# Patient Record
Sex: FEMALE | Race: WHITE | NOT HISPANIC OR LATINO
[De-identification: names, ages, dates, MRNs, and addresses within clinical notes are randomized per-mention and may not be internally consistent; named-entity substitution may affect disease eponyms.]

---

## 2018-06-19 ENCOUNTER — RESULT REVIEW (OUTPATIENT)
Age: 58
End: 2018-06-19

## 2019-08-14 ENCOUNTER — APPOINTMENT (OUTPATIENT)
Dept: VASCULAR SURGERY | Facility: CLINIC | Age: 59
End: 2019-08-14
Payer: COMMERCIAL

## 2019-08-14 PROCEDURE — 99204 OFFICE O/P NEW MOD 45 MIN: CPT

## 2023-05-12 PROBLEM — Z00.00 ENCOUNTER FOR PREVENTIVE HEALTH EXAMINATION: Status: ACTIVE | Noted: 2023-05-12

## 2023-08-23 ENCOUNTER — APPOINTMENT (OUTPATIENT)
Dept: HEMATOLOGY ONCOLOGY | Facility: CLINIC | Age: 63
End: 2023-08-23
Payer: MEDICAID

## 2023-08-23 PROCEDURE — 99205 OFFICE O/P NEW HI 60 MIN: CPT | Mod: 95

## 2023-08-25 NOTE — ADDENDUM
[FreeTextEntry1] : The visit was conducted using a combination of in-person and telehealth (using real-time 2-way audio visual technology) counseling.  The patient, Meaghan Lamb, and Genetic Counselor, Cyndi Myrick, were both located at the medical office in [place], [place]. The physician, Dr. Alon Navarrete, joined the session virtually from . The patient and both providers participated in the telehealth encounter. Consent for telehealth services was given on 2023 by the patient, Meaghan Lamb.    REASON FOR CONSULT  Meaghan Lamb is a 62-year-old female who was referred by Dr. Silva for a discussion regarding her genetic testing results related to hereditary cancer predisposition.    RELEVANT MEDICAL HISTORY  Ms. Lamb was diagnosed with breast cancer in May 2017 at age 57. The patient and referral note reported this as bilateral breast cancer. Histology records were available for right breast only, confirming invasive ductal carcinoma and lobular carcinoma in situ as well as flat epithelial atypia. Ms. Lamb reported treatment with bilateral lumpectomies followed by radiation therapy and 5 years of chemoprevention which she completed in 2022.  Ms. 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   Ms. 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 This test was ordered by Dr. Silva and reported on 6/10/2023.    OTHER MEDICAL AND SURGICAL HISTORY:  Anxiety and depression disorder. History of GERD. Bilateral lumpectomies. Total hysterectomy and BSO. History of hernia. Surgery for rectocele. Tonsillectomy. Cholecystectomy. Bunionectomy. Hand surgery for arthritis.    PAST OB/GYN HISTORY:  Obstetrical History:  Age at Menarche: 11 Menopausal with LMP at age 55  Age at First Live Birth: 30 Oral Contraceptive Use: Yes, (12 years)  Hormone Replacement Therapy: No    CANCER SCREENING HISTORY:  Breast: Annual MRI with mammogram and US spaced at 6 monthly intervals. Last screening was within the last year and reported normal. All follow up screenings post breast cancer diagnosis were reported normal.   GYN: No follow up since hysterectomy~ 5 years ago.  Colon: Recent colonoscopy in 2023, "two small colon polyps" removed benign pathology reported, no records available. Previously followed with three yearly colonoscopies, history of hyperplastic polyp of rectum in 2013. Follow up suggested annually d/t PMS2 positive status.   Endoscopy 2023- abnormal finding biopsied and due to follow up. Two-yearly endoscopies recommended. Skin: Last FBSE in 2022. Squamous cell on hand removed with skin graft 2023.     SOCIAL HISTORY:  -	 -	Tobacco-product use: No, (Marijuana on and off since age 20s)   FAMILY HISTORY:  Maternal ancestry was reported as [place], English and [place] and paternal ancestry was reported as [place] and English. A detailed family history of cancer was ascertained, see below and scanned pedigree in chart.    To Ms. Lamb's knowledge, no one in the family has had germline testing for cancer susceptibility.     RESULTS INTERPRETATION AND ASSESSMENT:  We reviewed with Ms. Lamb that she tested positive for a pathogenic mutation in the PMS2 gene. 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 Ms. Lamb was informed that individuals with a pathogenic PMS2 mutation have increased risks for the following:    COLORECTAL CANCER RISK:   Lifetime risk to develop colorectal cancer to age 80 is estimated to be 8.7-20% compared to the general population risk of approximately 4.2%, with a mean age of onset of 61-66 years.     ENDOMETRIAL CANCER RISK:   Lifetime risk to develop endometrial cancer is estimated to be 13-26% compared to the general population risk of 3.1%, with a mean age of onset of 49-50 years.      OVARIAN CANCER RISK:   Lifetime risk to develop ovarian cancer is estimated to be 3% compared to the general population risk of 1.3%, with a mean age of onset of 51-59 years.     OTHER CANCER RISKS:   Other cancer risks include renal pelvis and/or ureter (1-3.7%, mean age of onset: no data), biliary tract (0.2- 1%, mean age of onset: no data), brain (0.6-1%, mean age of onset: 40 years), and small bowel (0.1-0.3%, mean age of onset: single case - 59 years) compared to the general population risk of <1%. In addition, lifetime risk for prostate cancer (4.6-11.6%, mean age of onset: 63 years), bladder cancer (1-2.4%; mean age of onset: 71 years), breast cancer (8.1-12.8%; mean age of onset: no data), and pancreatic cancer (1-1.6%; mean age of onset: no data) are essentially equivalent to the general population risk, 11.6%, 2.4%, 12.8% and 1.6% respectively. Gastric cancer is also within the LS spectrum but there is inadequate lifetime risk data.     PATIENT MANAGEMENT IMPLICATIONS:  Given Ms. Lamb's personal and current reported family history of cancer, and her PMS2 positive genetic test results, the following screening guidelines and risk-reducing recommendations were discussed:    BREAST: - There is not enough evidence to support increased breast cancer screening in individuals with LS. Ms. Ortizs breast cancers are likely unrelated to her PMS2 mutation status. - Given Ms. Lamb's personal history of breast cancer, her long-term management and surveillance should be based on her on- or post-treatment protocol as recommended by her oncologist and breast specialist.    COLON:  - Colonoscopies every 1-3 years are recommended starting at age 30-35 years (or 2-5 years prior to the earliest colon cancer diagnosis if it is diagnosed before age 30). We emphasized interval between colonoscopies should not exceed 2 years.  -  Ms. Lamb should follow up with colonoscopies as per her Gastroenterologists recommendation based on her personal polyp history and given her PMS2 mutation ensure the interval between colonoscopies does not exceed 2 years.     - We also discussed there is some data demonstrating that the use of aspirin may decrease colorectal cancer risk in individuals with LS. Optimal dose and duration of use of aspirin for colon cancer prevention in LS is still being investigated. We feel it is not unreasonable to introduce the use of aspirin alongside regular colonoscopies provided there are no contraindications. We suggested Ms. Lamb discuss the option of taking aspirin with her Gastroenterologist.    GYN:  - Given the lack of efficacious screening for ovarian cancer and limited benefit of endometrial cancer screening via endometrial biopsy every 1-2 years, hysterectomy along with bilateral salpingo-oophorectomy (BSO) were discussed as a reasonable risk reducing option for women with Alvarez syndrome once childbearing is complete. Ms. Lamb reports she has one ovary removed at age 29, and the remaining ovary was removed at the time of her total hysterectomy. We encouraged Ms. Lamb to confirm with her Gynecologist that she has undergone bilateral salpingo- oophorectomy.  - We recommended Ms. Lamb continue with regular gynecological follow up which may be individualized based on shared decision making with her Gynecologist.    UROTHELIAL:  - There is no clear evidence to support screening for urothelial cancer in LS. Surveillance may be considered in select individuals with a family history of urothelial cancer. Surveillance options may include annual urinalysis with urine cytology starting at age 30-35.    GASTRIC AND SMALL BOWEL:  - No clear data exists to support surveillance for gastric, duodenal, and more distal small bowel cancer in LS. However, some risk factors include older age, MLH1 or MSH2 pathogenic mutations, family history of gastric cancer,  ethnicity, and immigrating from or residing in countries with high background incidence of gastric cancer, chronic autoimmune gastritis, gastric intestinal metaplasia, and gastric adenomas. For those with the above risk factors, baseline EGD at age 40 and surveillance EGD every 3-5 years may be considered.    PANCREATIC:  - Screening may be considered for individuals with a pathogenic PMS2 mutation and a family history of pancreatic cancer (first or second degree relative) beginning at age 50 (or 10 years younger than earliest diagnosis) in the setting of an experienced high-volume center.  - Given Ms. Lamb's PMS2 mutation status and current reported family history, screening is not indicated at this time.     BRAIN:  - We discussed the importance of being educated regarding signs and symptoms of neurologic cancer and the importance of prompt reporting of abnormal symptoms to her care team.     OTHER:   - In the absence of other indications, Ms. Lamb should practice age-appropriate cancer screening of other organ systems as recommended for the general population.    IMPLICATIONS FOR FAMILY MEMBERS:  This mutation is inherited in an autosomal dominant pattern. We recommend the patient's first-degree relatives, specifically her children and siblings, pursue genetic counseling and genetic testing as there is a 50% chance they also have the same mutation. Ms. Lamb was made aware that if any at-risk relatives wanted to pursue genetic testing any time in the future, we would be happy to see them and coordinate testing. If they are not local, they can locate a genetic counselor using the National Society of Genetic Counselors, Find a Genetic Counselor Tool (www.nsgc.org/findageneticcounselor).      REPRODUCTIVE IMPLICATIONS AND OPTIONS  The risk of passing on this mutation to a future generation is 50%. Since the genetic mutation is known, pre-implantation genetic diagnosis (PGD) is possible. PGT and prenatal diagnosis were discussed briefly as options for individuals of reproductive age.    Please note, individuals with biallelic (two) mutations in the PMS2 gene have been reported to have Constitutional Mismatch Repair Deficiency (CMMRD). CMMRD is an autosomal recessive condition characterized by early-onset cancer, usually in childhood or young adult. The most common cancers are brain/CNS tumors, gastrointestinal malignancies, and hematological malignancies. For those of reproductive age, we recommend the reproductive partner of an individual who is a PMS2 carrier also have PMS2 genetic testing to assess the risk of having a child affected with CMMRD if it would inform reproductive decision making. If both individuals are carriers of PMS2 mutations, there is a 25% chance for each pregnancy to be affected with CMMRD. This information may be relevant to Ms. Lamb's relatives who are at reproductive age.    RESOURCES & SUPPORT GROUPS  Colon Cancer [place]: https://www.ccalliance.org/  [employer]: https://www.aliveandkickn.org/  Alvarez Syndrome [employer] (www.lynchcancers.com)  Colon Cancer [place] for Research and Education for Alvarez Syndrome (www.fightlynch.org)    In addition, the oncology social workers at [facility] are available to assist with more referrals, if necessary.    PLAN:  1. See above note for recommended management.  2. We encouraged sharing these results with family members as noted above. They have a risk to have inherited the same mutation. Other family may benefit from genetic testing and should contact a certified genetic counselor specializing in cancer. Due to HIPAA and [place] laws, Genetics is unable to directly contact other family at risk, but we are available should family members wish to reach out to us. A family sharing letter will be provided to Ms. Lamb to help facilitate communication and sharing of genetic information.  3. Family support resources and referrals were provided.   4. Patient informed consult note(s) will be available through their [facility] patient portal and genetic test results were printed and provided to the patient in clinic.  5. Ms. Lamb signed a medical release to allow discussion of her genetics information with her children. This will be scanned into her chart.  6. Genetic knowledge changes rapidly. We encouraged re-contacting Cancer Genetics every 2-3 years for any changes in screening recommendations or sooner if there are significant changes in personal or family history.    For any additional questions please call Cancer Genetics at [phone].       Myrna Almanzar, [facility]  Genetic Counselor, Cancer Genetics    Alon Navarrete MD  Chief, Cancer Genetics    CC:   Patient  Dr. Silva